# Patient Record
Sex: FEMALE | URBAN - NONMETROPOLITAN AREA
[De-identification: names, ages, dates, MRNs, and addresses within clinical notes are randomized per-mention and may not be internally consistent; named-entity substitution may affect disease eponyms.]

---

## 2024-07-23 ENCOUNTER — APPOINTMENT (OUTPATIENT)
Dept: URBAN - NONMETROPOLITAN AREA CLINIC 60 | Age: 69
Setting detail: DERMATOLOGY
End: 2024-07-23

## 2024-07-23 DIAGNOSIS — L30.9 DERMATITIS, UNSPECIFIED: ICD-10-CM

## 2024-07-23 DIAGNOSIS — L82.1 OTHER SEBORRHEIC KERATOSIS: ICD-10-CM

## 2024-07-23 PROCEDURE — OTHER COUNSELING: OTHER

## 2024-07-23 PROCEDURE — OTHER PRESCRIPTION MEDICATION MANAGEMENT: OTHER

## 2024-07-23 PROCEDURE — 99203 OFFICE O/P NEW LOW 30 MIN: CPT

## 2024-07-23 ASSESSMENT — LOCATION DETAILED DESCRIPTION DERM
LOCATION DETAILED: LEFT SUPERIOR PREAURICULAR CHEEK
LOCATION DETAILED: LEFT DISTAL POSTERIOR UPPER ARM
LOCATION DETAILED: LEFT DISTAL DORSAL FOREARM
LOCATION DETAILED: RIGHT CRUS OF HELIX

## 2024-07-23 ASSESSMENT — LOCATION ZONE DERM
LOCATION ZONE: ARM
LOCATION ZONE: EAR
LOCATION ZONE: FACE

## 2024-07-23 ASSESSMENT — LOCATION SIMPLE DESCRIPTION DERM
LOCATION SIMPLE: LEFT FOREARM
LOCATION SIMPLE: LEFT POSTERIOR UPPER ARM
LOCATION SIMPLE: LEFT CHEEK
LOCATION SIMPLE: RIGHT EAR

## 2024-07-23 ASSESSMENT — SEVERITY ASSESSMENT: SEVERITY: CLEAR

## 2024-07-23 ASSESSMENT — BSA RASH: BSA RASH: 2

## 2024-07-23 ASSESSMENT — ITCH NUMERIC RATING SCALE: HOW SEVERE IS YOUR ITCHING?: 1

## 2024-07-23 NOTE — PROCEDURE: PRESCRIPTION MEDICATION MANAGEMENT
Plan: Information given to patient for the [facility] plant and pest identification center, so the patient can send in a sample and have it identified\\nNo clinical findings during physical exam today \\nPatient seen multiple providers prior to our visit
Render In Strict Bullet Format?: No
Detail Level: Zone

## 2024-08-30 ENCOUNTER — APPOINTMENT (OUTPATIENT)
Dept: CT IMAGING | Facility: HOSPITAL | Age: 69
End: 2024-08-30
Payer: MEDICARE

## 2024-08-30 ENCOUNTER — APPOINTMENT (OUTPATIENT)
Dept: GENERAL RADIOLOGY | Facility: HOSPITAL | Age: 69
End: 2024-08-30
Payer: MEDICARE

## 2024-08-30 ENCOUNTER — HOSPITAL ENCOUNTER (EMERGENCY)
Facility: HOSPITAL | Age: 69
Discharge: HOME | End: 2024-08-31
Attending: STUDENT IN AN ORGANIZED HEALTH CARE EDUCATION/TRAINING PROGRAM
Payer: MEDICARE

## 2024-08-30 DIAGNOSIS — B34.8 RHINOVIRUS: Primary | ICD-10-CM

## 2024-08-30 DIAGNOSIS — B97.89: ICD-10-CM

## 2024-08-30 DIAGNOSIS — G93.49: ICD-10-CM

## 2024-08-30 LAB
ALBUMIN SERPL-MCNC: 4.1 G/DL (ref 3.5–5.2)
ALBUMIN/GLOB SERPL: 1.2 G/DL
ALP SERPL-CCNC: 434 U/L (ref 39–117)
ALT SERPL W P-5'-P-CCNC: 13 U/L (ref 1–33)
AMMONIA BLD-SCNC: 40 UMOL/L (ref 11–51)
ANION GAP SERPL CALCULATED.3IONS-SCNC: 11.2 MMOL/L (ref 5–15)
AST SERPL-CCNC: 33 U/L (ref 1–32)
BASOPHILS # BLD AUTO: 0.04 10*3/MM3 (ref 0–0.2)
BASOPHILS NFR BLD AUTO: 0.5 % (ref 0–1.5)
BILIRUB SERPL-MCNC: 1.2 MG/DL (ref 0–1.2)
BILIRUB UR QL STRIP: NEGATIVE
BUN SERPL-MCNC: 16 MG/DL (ref 8–23)
BUN/CREAT SERPL: 14.2 (ref 7–25)
CALCIUM SPEC-SCNC: 9.2 MG/DL (ref 8.6–10.5)
CHLORIDE SERPL-SCNC: 104 MMOL/L (ref 98–107)
CLARITY UR: CLEAR
CO2 SERPL-SCNC: 24.8 MMOL/L (ref 22–29)
COLOR UR: YELLOW
CREAT SERPL-MCNC: 1.13 MG/DL (ref 0.57–1)
DEPRECATED RDW RBC AUTO: 51.8 FL (ref 37–54)
EGFRCR SERPLBLD CKD-EPI 2021: 53.1 ML/MIN/1.73
EOSINOPHIL # BLD AUTO: 0.16 10*3/MM3 (ref 0–0.4)
EOSINOPHIL NFR BLD AUTO: 2.1 % (ref 0.3–6.2)
ERYTHROCYTE [DISTWIDTH] IN BLOOD BY AUTOMATED COUNT: 15.8 % (ref 12.3–15.4)
ETHANOL BLD-MCNC: <10 MG/DL (ref 0–10)
ETHANOL UR QL: <0.01 %
GLOBULIN UR ELPH-MCNC: 3.3 GM/DL
GLUCOSE SERPL-MCNC: 126 MG/DL (ref 65–99)
GLUCOSE UR STRIP-MCNC: NEGATIVE MG/DL
HCT VFR BLD AUTO: 38.7 % (ref 34–46.6)
HGB BLD-MCNC: 12.6 G/DL (ref 12–15.9)
HGB UR QL STRIP.AUTO: ABNORMAL
HOLD SPECIMEN: NORMAL
HOLD SPECIMEN: NORMAL
IMM GRANULOCYTES # BLD AUTO: 0.03 10*3/MM3 (ref 0–0.05)
IMM GRANULOCYTES NFR BLD AUTO: 0.4 % (ref 0–0.5)
KETONES UR QL STRIP: NEGATIVE
LEUKOCYTE ESTERASE UR QL STRIP.AUTO: NEGATIVE
LYMPHOCYTES # BLD AUTO: 0.63 10*3/MM3 (ref 0.7–3.1)
LYMPHOCYTES NFR BLD AUTO: 8.2 % (ref 19.6–45.3)
MAGNESIUM SERPL-MCNC: 2.1 MG/DL (ref 1.6–2.4)
MCH RBC QN AUTO: 29.2 PG (ref 26.6–33)
MCHC RBC AUTO-ENTMCNC: 32.6 G/DL (ref 31.5–35.7)
MCV RBC AUTO: 89.6 FL (ref 79–97)
MONOCYTES # BLD AUTO: 0.76 10*3/MM3 (ref 0.1–0.9)
MONOCYTES NFR BLD AUTO: 9.9 % (ref 5–12)
NEUTROPHILS NFR BLD AUTO: 6.03 10*3/MM3 (ref 1.7–7)
NEUTROPHILS NFR BLD AUTO: 78.9 % (ref 42.7–76)
NITRITE UR QL STRIP: NEGATIVE
NRBC BLD AUTO-RTO: 0 /100 WBC (ref 0–0.2)
NT-PROBNP SERPL-MCNC: 1554 PG/ML (ref 0–900)
PH UR STRIP.AUTO: 6.5 [PH] (ref 5–8)
PLATELET # BLD AUTO: 145 10*3/MM3 (ref 140–450)
PMV BLD AUTO: 10.7 FL (ref 6–12)
POTASSIUM SERPL-SCNC: 4.2 MMOL/L (ref 3.5–5.2)
PROT SERPL-MCNC: 7.4 G/DL (ref 6–8.5)
PROT UR QL STRIP: ABNORMAL
RBC # BLD AUTO: 4.32 10*6/MM3 (ref 3.77–5.28)
SODIUM SERPL-SCNC: 140 MMOL/L (ref 136–145)
SP GR UR STRIP: 1.02 (ref 1–1.03)
TROPONIN T SERPL HS-MCNC: 11 NG/L
TSH SERPL DL<=0.05 MIU/L-ACNC: 0.07 UIU/ML (ref 0.27–4.2)
UROBILINOGEN UR QL STRIP: ABNORMAL
WBC NRBC COR # BLD AUTO: 7.65 10*3/MM3 (ref 3.4–10.8)
WHOLE BLOOD HOLD COAG: NORMAL
WHOLE BLOOD HOLD SPECIMEN: NORMAL

## 2024-08-30 PROCEDURE — 36415 COLL VENOUS BLD VENIPUNCTURE: CPT

## 2024-08-30 PROCEDURE — 94640 AIRWAY INHALATION TREATMENT: CPT

## 2024-08-30 PROCEDURE — 84484 ASSAY OF TROPONIN QUANT: CPT | Performed by: STUDENT IN AN ORGANIZED HEALTH CARE EDUCATION/TRAINING PROGRAM

## 2024-08-30 PROCEDURE — 82077 ASSAY SPEC XCP UR&BREATH IA: CPT | Performed by: STUDENT IN AN ORGANIZED HEALTH CARE EDUCATION/TRAINING PROGRAM

## 2024-08-30 PROCEDURE — 84439 ASSAY OF FREE THYROXINE: CPT | Performed by: STUDENT IN AN ORGANIZED HEALTH CARE EDUCATION/TRAINING PROGRAM

## 2024-08-30 PROCEDURE — 71250 CT THORAX DX C-: CPT

## 2024-08-30 PROCEDURE — 99284 EMERGENCY DEPT VISIT MOD MDM: CPT

## 2024-08-30 PROCEDURE — 83735 ASSAY OF MAGNESIUM: CPT | Performed by: STUDENT IN AN ORGANIZED HEALTH CARE EDUCATION/TRAINING PROGRAM

## 2024-08-30 PROCEDURE — 83880 ASSAY OF NATRIURETIC PEPTIDE: CPT | Performed by: NURSE PRACTITIONER

## 2024-08-30 PROCEDURE — 82140 ASSAY OF AMMONIA: CPT | Performed by: STUDENT IN AN ORGANIZED HEALTH CARE EDUCATION/TRAINING PROGRAM

## 2024-08-30 PROCEDURE — 81001 URINALYSIS AUTO W/SCOPE: CPT | Performed by: STUDENT IN AN ORGANIZED HEALTH CARE EDUCATION/TRAINING PROGRAM

## 2024-08-30 PROCEDURE — 93005 ELECTROCARDIOGRAM TRACING: CPT | Performed by: STUDENT IN AN ORGANIZED HEALTH CARE EDUCATION/TRAINING PROGRAM

## 2024-08-30 PROCEDURE — 80053 COMPREHEN METABOLIC PANEL: CPT | Performed by: STUDENT IN AN ORGANIZED HEALTH CARE EDUCATION/TRAINING PROGRAM

## 2024-08-30 PROCEDURE — 63710000001 PREDNISONE PER 5 MG: Performed by: STUDENT IN AN ORGANIZED HEALTH CARE EDUCATION/TRAINING PROGRAM

## 2024-08-30 PROCEDURE — 70450 CT HEAD/BRAIN W/O DYE: CPT

## 2024-08-30 PROCEDURE — 84443 ASSAY THYROID STIM HORMONE: CPT | Performed by: STUDENT IN AN ORGANIZED HEALTH CARE EDUCATION/TRAINING PROGRAM

## 2024-08-30 PROCEDURE — 71045 X-RAY EXAM CHEST 1 VIEW: CPT

## 2024-08-30 PROCEDURE — 85025 COMPLETE CBC W/AUTO DIFF WBC: CPT | Performed by: STUDENT IN AN ORGANIZED HEALTH CARE EDUCATION/TRAINING PROGRAM

## 2024-08-30 RX ORDER — SODIUM CHLORIDE 0.9 % (FLUSH) 0.9 %
10 SYRINGE (ML) INJECTION AS NEEDED
Status: DISCONTINUED | OUTPATIENT
Start: 2024-08-30 | End: 2024-08-31 | Stop reason: HOSPADM

## 2024-08-30 RX ORDER — PREDNISONE 50 MG/1
50 TABLET ORAL ONCE
Status: COMPLETED | OUTPATIENT
Start: 2024-08-30 | End: 2024-08-30

## 2024-08-30 RX ORDER — LEVOTHYROXINE SODIUM 150 UG/1
150 TABLET ORAL DAILY
COMMUNITY

## 2024-08-30 RX ORDER — ESCITALOPRAM OXALATE 10 MG/1
10 TABLET ORAL DAILY
COMMUNITY

## 2024-08-30 RX ORDER — SIMVASTATIN 10 MG
40 TABLET ORAL DAILY
COMMUNITY

## 2024-08-30 RX ORDER — LAMOTRIGINE 200 MG/1
400 TABLET ORAL DAILY
COMMUNITY

## 2024-08-30 RX ORDER — AMOXICILLIN 250 MG
1 CAPSULE ORAL 2 TIMES DAILY
COMMUNITY

## 2024-08-30 RX ORDER — ALBUTEROL SULFATE 0.83 MG/ML
5 SOLUTION RESPIRATORY (INHALATION) ONCE
Status: COMPLETED | OUTPATIENT
Start: 2024-08-30 | End: 2024-08-30

## 2024-08-30 RX ORDER — NITROGLYCERIN 0.4 MG/1
0.4 TABLET SUBLINGUAL EVERY 24 HOURS
COMMUNITY

## 2024-08-30 RX ADMIN — IPRATROPIUM BROMIDE 0.5 MG: 0.5 SOLUTION RESPIRATORY (INHALATION) at 23:14

## 2024-08-30 RX ADMIN — PREDNISONE 50 MG: 50 TABLET ORAL at 23:10

## 2024-08-30 RX ADMIN — ALBUTEROL SULFATE 5 MG: 2.5 SOLUTION RESPIRATORY (INHALATION) at 23:14

## 2024-08-31 VITALS
OXYGEN SATURATION: 93 % | HEART RATE: 61 BPM | TEMPERATURE: 98.1 F | BODY MASS INDEX: 27.62 KG/M2 | WEIGHT: 176 LBS | HEIGHT: 67 IN | RESPIRATION RATE: 11 BRPM | DIASTOLIC BLOOD PRESSURE: 93 MMHG | SYSTOLIC BLOOD PRESSURE: 167 MMHG

## 2024-08-31 LAB
B PARAPERT DNA SPEC QL NAA+PROBE: NOT DETECTED
B PERT DNA SPEC QL NAA+PROBE: NOT DETECTED
BACTERIA UR QL AUTO: ABNORMAL /HPF
C PNEUM DNA NPH QL NAA+NON-PROBE: NOT DETECTED
FLUAV SUBTYP SPEC NAA+PROBE: NOT DETECTED
FLUBV RNA ISLT QL NAA+PROBE: NOT DETECTED
HADV DNA SPEC NAA+PROBE: NOT DETECTED
HCOV 229E RNA SPEC QL NAA+PROBE: NOT DETECTED
HCOV HKU1 RNA SPEC QL NAA+PROBE: NOT DETECTED
HCOV NL63 RNA SPEC QL NAA+PROBE: NOT DETECTED
HCOV OC43 RNA SPEC QL NAA+PROBE: NOT DETECTED
HMPV RNA NPH QL NAA+NON-PROBE: NOT DETECTED
HPIV1 RNA ISLT QL NAA+PROBE: NOT DETECTED
HPIV2 RNA SPEC QL NAA+PROBE: NOT DETECTED
HPIV3 RNA NPH QL NAA+PROBE: NOT DETECTED
HPIV4 P GENE NPH QL NAA+PROBE: NOT DETECTED
HYALINE CASTS UR QL AUTO: ABNORMAL /LPF
M PNEUMO IGG SER IA-ACNC: NOT DETECTED
RBC # UR STRIP: ABNORMAL /HPF
REF LAB TEST METHOD: ABNORMAL
RHINOVIRUS RNA SPEC NAA+PROBE: DETECTED
RSV RNA NPH QL NAA+NON-PROBE: NOT DETECTED
SARS-COV-2 RNA NPH QL NAA+NON-PROBE: NOT DETECTED
SQUAMOUS #/AREA URNS HPF: ABNORMAL /HPF
T4 FREE SERPL-MCNC: 1.63 NG/DL (ref 0.92–1.68)
WBC # UR STRIP: ABNORMAL /HPF

## 2024-08-31 PROCEDURE — 96360 HYDRATION IV INFUSION INIT: CPT

## 2024-08-31 PROCEDURE — 0202U NFCT DS 22 TRGT SARS-COV-2: CPT | Performed by: NURSE PRACTITIONER

## 2024-08-31 PROCEDURE — 25810000003 SODIUM CHLORIDE 0.9 % SOLUTION: Performed by: NURSE PRACTITIONER

## 2024-08-31 RX ORDER — ALBUTEROL SULFATE 0.63 MG/3ML
1 SOLUTION RESPIRATORY (INHALATION) EVERY 6 HOURS PRN
Qty: 40 ML | Refills: 0 | Status: SHIPPED | OUTPATIENT
Start: 2024-08-31 | End: 2024-09-10

## 2024-08-31 RX ADMIN — SODIUM CHLORIDE 500 ML: 9 INJECTION, SOLUTION INTRAVENOUS at 01:37

## 2024-08-31 NOTE — ED PROVIDER NOTES
"Subjective:  History of Present Illness:    Patient is a 69-year-old female with history of COPD, hypertension and coronary artery disease.  Patient is a resident of [place].  Came to [place] to visit her daughter.  Upon arrival to [place] patient's daughter felt that her mother was not acting as she normally does.  She reports fatigue and intermittent confusion and poor p.o. intake at home.  Patient's daughter reports that patient fell on Wednesday.  She denies LOC or hitting her head.  Patient reports that she has had some cough congestion for the last 3 days.  Patient is alert and oriented during this interview.  She denies dysuria frequency or hematuria.  Denies changes in bowel habit.  Denies shortness of breath or chest pain.  Denies OTC medication home remedy.  Denies alleviating or exacerbating factors.    Nurses Notes reviewed and agree, including vitals, allergies, social history and prior medical history.     REVIEW OF SYSTEMS: All systems reviewed and not pertinent unless noted.  Review of Systems   HENT:  Positive for congestion.    Respiratory:  Positive for cough.    All other systems reviewed and are negative.      Past Medical History:   Diagnosis Date    Anxiety     COPD (chronic obstructive pulmonary disease)     Coronary artery disease     Depression     High-grade atrioventricular block     History of percutaneous coronary intervention     History of placement of stent in LAD coronary artery 2010    Hyperlipidemia     Hypertension     Pacemaker     Paroxysmal A-fib     Seizures     Syncope        Allergies:    Latex and Penicillins      History reviewed. No pertinent surgical history.      Social History     Socioeconomic History    Marital status: Single         History reviewed. No pertinent family history.    Objective  Physical Exam:  /93   Pulse 61   Temp 98.1 °F (36.7 °C) (Oral)   Resp 11   Ht 170.2 cm (67\")   Wt 79.8 kg (176 lb)   SpO2 93%   BMI 27.57 kg/m²      Physical " Exam  Vitals and nursing note reviewed.   Constitutional:       Appearance: She is well-developed and normal weight.   HENT:      Head: Normocephalic and atraumatic.      Mouth/Throat:      Mouth: Mucous membranes are moist.      Pharynx: Oropharynx is clear.   Eyes:      Extraocular Movements: Extraocular movements intact.      Pupils: Pupils are equal, round, and reactive to light.   Cardiovascular:      Rate and Rhythm: Normal rate and regular rhythm.      Heart sounds: Normal heart sounds.      Comments: Patient has 1+ edema bilateral ankle.  She reports that this is unchanged from her baseline.  Pulmonary:      Effort: Pulmonary effort is normal.      Breath sounds: Wheezing present.   Abdominal:      General: Bowel sounds are normal.      Palpations: Abdomen is soft.   Musculoskeletal:         General: Normal range of motion.      Cervical back: Normal range of motion and neck supple.   Skin:     General: Skin is warm and dry.      Capillary Refill: Capillary refill takes less than 2 seconds.   Neurological:      Mental Status: She is alert and oriented to person, place, and time. Mental status is at baseline.   Psychiatric:         Mood and Affect: Mood normal.         Speech: Speech normal.         Behavior: Behavior normal.         Procedures    ED Course:    ED Course as of 08/31/24 0217   Fri Aug 30, 2024   [id number] I have interviewed and examined the patient FACE-TO-FACE.  I reviewed the mid-level provider(s) note and agree with the clinical impression, plan, and disposition unless otherwise stated in the MDM below.    ATTENDING ATTESTATION  HPI: 69-year-old female presents with altered mental status, transient confusion, cough and shortness of breath.  Patient's daughter is at bedside contributes to HPI.  Currently visiting from [place].      Past medical history of COPD, heart block status post pacemaker placement, CAD with stent x 1, seizure disorder.    EXAM:   General: Obese female. Alert.  Appears  chronically ill, but nontoxic.  No acute distress.  Head: Normocephalic.  Atraumatic.  Eyes: Pupils 2 mm. PERRLA. No scleral icterus.  ENT: Moist mucous membranes.  Cardiovascular: Regular rate and rhythm.  No murmurs.  No rubs.  2+ distal pulses bilaterally.  Respiratory: Equal breath sounds bilaterally.  No rales.  No rhonchi.  + wheezing. No conversational dyspnea or respiratory distress.   GI: Abdomen is soft.  Nondistended.  Nontender to palpation.  No rebound.  No guarding.  No CVA tenderness.  MSK: Moves all 4 extremities.  Neurologic: GCS 15. Oriented x 3.  No focal deficits.  Skin: No erythema.  No edema. No pallor. No cyanosis.     [JS]      ED Course User Index  [JS] Torsten Sawyer DO       Lab Results (last 24 hours)       Procedure Component Value Units Date/Time    CBC & Differential [[id number]]  (Abnormal) Collected: 08/30/24 2048    Specimen: Blood Updated: 08/30/24 2056    Narrative:      The following orders were created for panel order CBC & Differential.  Procedure                               Abnormality         Status                     ---------                               -----------         ------                     CBC Auto Differential[[id number]]        Abnormal            Final result                 Please view results for these tests on the individual orders.    Comprehensive Metabolic Panel [[id number]]  (Abnormal) Collected: 08/30/24 2048    Specimen: Blood Updated: 08/30/24 2113     Glucose 126 mg/dL      BUN 16 mg/dL      Creatinine 1.13 mg/dL      Sodium 140 mmol/L      Potassium 4.2 mmol/L      Chloride 104 mmol/L      CO2 24.8 mmol/L      Calcium 9.2 mg/dL      Total Protein 7.4 g/dL      Albumin 4.1 g/dL      ALT (SGPT) 13 U/L      AST (SGOT) 33 U/L      Alkaline Phosphatase 434 U/L      Total Bilirubin 1.2 mg/dL      Globulin 3.3 gm/dL      A/G Ratio 1.2 g/dL      BUN/Creatinine Ratio 14.2     Anion Gap 11.2 mmol/L      eGFR 53.1 mL/min/1.73     Narrative:      GFR Normal  >60  Chronic Kidney Disease <60  Kidney Failure <15      Single High Sensitivity Troponin T [[id number]]  (Normal) Collected: 08/30/24 2048    Specimen: Blood Updated: 08/30/24 2116     HS Troponin T 11 ng/L     Narrative:      High Sensitive Troponin T Reference Range:  <14.0 ng/L- Negative Female for AMI  <22.0 ng/L- Negative Male for AMI  >=14 - Abnormal Female indicating possible myocardial injury.  >=22 - Abnormal Male indicating possible myocardial injury.   Clinicians would have to utilize clinical acumen, EKG, Troponin, and serial changes to determine if it is an Acute Myocardial Infarction or myocardial injury due to an underlying chronic condition.         Magnesium [[id number]]  (Normal) Collected: 08/30/24 2048    Specimen: Blood Updated: 08/30/24 2113     Magnesium 2.1 mg/dL     CBC Auto Differential [[id number]]  (Abnormal) Collected: 08/30/24 2048    Specimen: Blood Updated: 08/30/24 2056     WBC 7.65 10*3/mm3      RBC 4.32 10*6/mm3      Hemoglobin 12.6 g/dL      Hematocrit 38.7 %      MCV 89.6 fL      MCH 29.2 pg      MCHC 32.6 g/dL      RDW 15.8 %      RDW-SD 51.8 fl      MPV 10.7 fL      Platelets 145 10*3/mm3      Neutrophil % 78.9 %      Lymphocyte % 8.2 %      Monocyte % 9.9 %      Eosinophil % 2.1 %      Basophil % 0.5 %      Immature Grans % 0.4 %      Neutrophils, Absolute 6.03 10*3/mm3      Lymphocytes, Absolute 0.63 10*3/mm3      Monocytes, Absolute 0.76 10*3/mm3      Eosinophils, Absolute 0.16 10*3/mm3      Basophils, Absolute 0.04 10*3/mm3      Immature Grans, Absolute 0.03 10*3/mm3      nRBC 0.0 /100 WBC     BNP [[id number]]  (Abnormal) Collected: 08/30/24 2048    Specimen: Blood Updated: 08/30/24 2116     proBNP 1,554.0 pg/mL     Narrative:      This assay is used as an aid in the diagnosis of individuals suspected of having heart failure. It can be used as an aid in the diagnosis of acute decompensated heart failure (ADHF) in patients presenting with signs and symptoms of ADHF to the  emergency department (ED). In addition, NT-proBNP of <300 pg/mL indicates ADHF is not likely.    Age Range Result Interpretation  NT-proBNP Concentration (pg/mL:      <50             Positive            >450                   Gray                 300-450                    Negative             <300    50-75           Positive            >900                  Gray                300-900                  Negative            <300      >75             Positive            >1800                  Gray                300-1800                  Negative            <300    TSH Rfx On Abnormal To Free T4 [[id number]]  (Abnormal) Collected: 08/30/24 2048    Specimen: Blood Updated: 08/30/24 2334     TSH 0.072 uIU/mL     Ethanol [[id number]] Collected: 08/30/24 2048    Specimen: Blood Updated: 08/30/24 2334     Ethanol <10 mg/dL      Ethanol % <0.010 %     Narrative:      This result is for medical use only and should not be used for forensic purposes.    T4, Free [[id number]]  (Normal) Collected: 08/30/24 2048    Specimen: Blood Updated: 08/31/24 0019     Free T4 1.63 ng/dL     Ammonia [[id number]]  (Normal) Collected: 08/30/24 2311    Specimen: Blood Updated: 08/30/24 2333     Ammonia 40 umol/L     Urinalysis With Microscopic If Indicated (No Culture) - Urine, Clean Catch [[id number]]  (Abnormal) Collected: 08/30/24 2326    Specimen: Urine, Clean Catch Updated: 08/30/24 2343     Color, UA Yellow     Appearance, UA Clear     pH, UA 6.5     Specific Gravity, UA 1.021     Glucose, UA Negative     Ketones, UA Negative     Bilirubin, UA Negative     Blood, UA Trace     Protein,  mg/dL (2+)     Leuk Esterase, UA Negative     Nitrite, UA Negative     Urobilinogen, UA 1.0 E.U./dL    Urinalysis, Microscopic Only - Urine, Clean Catch [[id number]]  (Abnormal) Collected: 08/30/24 2326    Specimen: Urine, Clean Catch Updated: 08/31/24 0004     RBC, UA 0-2 /HPF      WBC, UA None Seen /HPF      Bacteria, UA Trace /HPF      Squamous Epithelial  Cells, UA 0-2 /HPF      Hyaline Casts, UA None Seen /LPF      Methodology Manual Light Microscopy    Respiratory Panel PCR w/COVID-19(SARS-CoV-2) YANNI/ANTHONY/REZA/PAD/COR/INDIO In-House, NP Swab in UTM/VTM, 2 HR TAT - Swab, Nasopharynx [[id number]]  (Abnormal) Collected: 08/31/24 0019    Specimen: Swab from Nasopharynx Updated: 08/31/24 0108     ADENOVIRUS, PCR Not Detected     Coronavirus 229E Not Detected     Coronavirus HKU1 Not Detected     Coronavirus NL63 Not Detected     Coronavirus OC43 Not Detected     COVID19 Not Detected     Human Metapneumovirus Not Detected     Human Rhinovirus/Enterovirus Detected     Influenza A PCR Not Detected     Influenza B PCR Not Detected     Parainfluenza Virus 1 Not Detected     Parainfluenza Virus 2 Not Detected     Parainfluenza Virus 3 Not Detected     Parainfluenza Virus 4 Not Detected     RSV, PCR Not Detected     Bordetella pertussis pcr Not Detected     Bordetella parapertussis PCR Not Detected     Chlamydophila pneumoniae PCR Not Detected     Mycoplasma pneumo by PCR Not Detected    Narrative:      In the setting of a positive respiratory panel with a viral infection PLUS a negative procalcitonin without other underlying concern for bacterial infection, consider observing off antibiotics or discontinuation of antibiotics and continue supportive care. If the respiratory panel is positive for atypical bacterial infection (Bordetella pertussis, Chlamydophila pneumoniae, or Mycoplasma pneumoniae), consider antibiotic de-escalation to target atypical bacterial infection.             CT Chest Without Contrast Diagnostic    Result Date: 8/31/2024  FINAL REPORT TECHNIQUE: null CLINICAL HISTORY: SOA, confusion COMPARISON: null FINDINGS: Exam: CT chest without contrast Comparison: 08/30/2024 Clinical history: Shortness of air, confusion Findings: Left-sided pacemaker No thoracic aortic aneurysm. Calcifications seen at the aorta and coronary arteries. No hilar or mediastinal adenopathy.  Calcified subcarinal lymph node. Calcified right lower lobe pulmonary granuloma. Cardiac size is enlarged. No pericardial effusion. No pleural fluid collection. No acute pneumonia. Visualized liver and spleen do not demonstrate any acute process. No thoracic compression fractures     Impression: Impression: 1. No pneumonia Authenticated and Electronically Signed by Clementine Nguyen MD on 08/31/2024 01:00:23 AM    CT Head Without Contrast    Result Date: 8/31/2024  FINAL REPORT TECHNIQUE: null CLINICAL HISTORY: Transient confusion COMPARISON: null FINDINGS: Exam: CT Brain without contrast Comparison: None Clinical history: Transient confusion Findings: No acute intracranial hemorrhage. Cerebral volume loss. No abnormal extra-axial fluid collections. No intracranial mass No midline shift. No skull fracture.     Impression: Impression: 1. No acute intracranial hemorrhage. 2. Cerebral volume loss. Authenticated and Electronically Signed by Clementine Nguyen MD on 08/31/2024 12:55:21 AM    XR Chest 1 View    Result Date: 8/30/2024  FINAL REPORT TECHNIQUE: null CLINICAL HISTORY: WEAKNESS. COMPARISON: null FINDINGS: 1 view chest x-ray Comparison: None Findings: Left chest pacer device. The heart size is enlarged. No central venous congestion. Elevated right hemidiaphragm. Bibasilar atelectasis and/or scarring. No consolidation or pleural effusion. No pneumothorax. Degenerative changes without acute fracture.     Impression: IMPRESSION: 1. Cardiomegaly without acute process. No consolidation. Authenticated and Electronically Signed by Tae Chen DO on 08/30/2024 10:40:13 PM        MDM     Amount and/or Complexity of Data Reviewed  Clinical lab tests: reviewed  Tests in the radiology section of CPT®: reviewed  Tests in the medicine section of CPT®: reviewed        Initial impression of presenting illness: Patient is a 69-year-old female with history of COPD, hypertension and coronary artery disease.  Patient is a resident  [facility].  Came to [place] to visit her daughter.  Upon arrival to [place] patient's daughter felt that her mother was not acting as she normally does.  She reports fatigue and intermittent confusion and poor p.o. intake at home.  Patient's daughter reports that patient fell on Wednesday.  She denies LOC or hitting her head.  Patient reports that she has had some cough congestion for the last 3 days.  Patient is alert and oriented during this interview.  She denies dysuria frequency or hematuria.  Denies changes in bowel habit.  Denies shortness of breath or chest pain.  Denies OTC medication home remedy.  Denies alleviating or exacerbating factors.    DDX: includes but is not limited to: Pneumonia, urinary tract infection, subdural bleed, viral infection or other    Patient arrives stable with vitals interpreted by myself.     Pertinent features from physical exam: Lung sounds are with wheezing in bilateral upper lobe.  Middle and lower lobes are diminished.  Abdomen is soft nontender.  Bowel sounds are normal.  Card sounds are normal.  1+ edema noted bilateral lower extremity.  Per patient this is baseline for her.    Initial diagnostic plan: CMP, CBC, magnesium, BNP, TSH, free T4, urinalysis, EtOH, ammonia, troponin, respiratory panel, EKG, chest x-ray, chest CT without contrast, CT of head without contrast    Results from initial plan were reviewed and interpreted by me revealing CMP is with mild elevation in serum creatinine this is baseline for this patient via care everywhere.  CBC is without elevation white count.  Magnesium is within appropriate range.  BNP is elevated at 1,554 PG/mL.  Patient is with history of heart failure and is on Lasix daily.  TSH is 0.072 which is mildly low.  Free T4 is within normal parameter.  Urinalysis is with trace bacteria without leukocytosis.  Troponin is negative.  Ethanol is negative.  Ammonia is negative.  Respiratory panel was positive for rhinovirus.  EKG is sinus  rate of 84 bpm CT of chest with the following impression no pneumonia CT of head without contrast the following impression no acute intracranial hemorrhage.  No cerebral volume loss.    Diagnostic information from other sources: Chart review    Interventions / Re-evaluation: Vital signs stable throughout encounter.  Patient received normal saline 500 mL.  A DuoNeb, prednisone    Results/clinical rationale were discussed with patient    Consultations/Discussion of results with other physicians: N/A    Disposition plan: Patient is hemodynamically stable nontoxic-appearing appropriate discharge.  Will send patient home with albuterol nebulizer.  Have discussed elevation and BNP level with patient and daughter.  Patient reports that she does have Lasix at home if extra is needed.  Respiratory symptoms more likely from rhinovirus versus fluid overload given absence of fluid overload findings on chest CT.  Confusion most likely from viral encephalopathy.  Patient and daughter report that they do feel safe in going home.  I have reiterated strict return precaution if anything changes.  -----        Final diagnoses:   Rhinovirus   Encephalopathy associated with viral infection          Lincoln Bai, APRN  08/31/24 0217

## 2024-08-31 NOTE — DISCHARGE INSTRUCTIONS
Please follow-up with your PCP when you get back in [place].  Return to ER for new or worsening symptoms.